# Patient Record
(demographics unavailable — no encounter records)

---

## 2024-10-23 NOTE — DISCUSSION/SUMMARY
[Normal Growth] : growth [Normal Development] : development  [No Elimination Concerns] : elimination [Continue Regimen] : feeding [No Skin Concerns] : skin [Normal Sleep Pattern] : sleep [None] : no medical problems [Anticipatory Guidance Given] : Anticipatory guidance addressed as per the history of present illness section [Physical Growth and Development] : physical growth and development [Social and Academic Competence] : social and academic competence [Emotional Well-Being] : emotional well-being [Risk Reduction] : risk reduction [Violence and Injury Prevention] : violence and injury prevention [No Vaccines] : no vaccines needed [No Medications] : ~He/She~ is not on any medications [Patient] : patient [Parent/Guardian] : Parent/Guardian [] : The components of the vaccine(s) to be administered today are listed in the plan of care. The disease(s) for which the vaccine(s) are intended to prevent and the risks have been discussed with the caretaker.  The risks are also included in the appropriate vaccination information statements which have been provided to the patient's caregiver.  The caregiver has given consent to vaccinate. [FreeTextEntry1] : Well   pre adolescent.  - BMI >95th % - Vaccine needed Plan -Flu Vaccine administered. Vaccine education done.  Updated CIR copy given Monitored X10 minutes and returned to class. Anticipatory guidance given -Counseled regarding dental hygiene, pubertal changes, seatbelt safety, and internet safety. Healthy eating habits, no sugary drinks, exercise and keeping recreational screen time to less than 2 hours a day discussed. - Infection prevention discussed - Bright Futures Parent handout sent home   Routine dental care           Visit summary sent home

## 2024-10-23 NOTE — HISTORY OF PRESENT ILLNESS
[Yes] : Patient goes to dentist yearly [Needs Immunizations] : Needs immunizations [Normal] : normal [LMP: _____] : LMP: [unfilled] [Age of Menarche: ____] : Age of Menarche: [unfilled] [Irregular menses] : irregular menses [Grade: ____] : Grade: [unfilled] [Normal Performance] : normal performance [Normal Behavior/Attention] : normal behavior/attention [Normal Homework] : normal homework [Eats regular meals including adequate fruits and vegetables] : eats regular meals including adequate fruits and vegetables [Drinks non-sweetened liquids] : drinks non-sweetened liquids  [Calcium source] : calcium source [Has friends] : has friends [Uses safety belts/safety equipment] : uses safety belts/safety equipment  [No] : Patient has not had sexual intercourse [Has ways to cope with stress] : has ways to cope with stress [Displays self-confidence] : displays self-confidence [With Teen] : teen [Heavy Bleeding] : no heavy bleeding [Painful Cramps] : no painful cramps [Has concerns about body or appearance] : does not have concerns about body or appearance [At least 1 hour of physical activity a day] : does not do at least 1 hour of physical activity a day [Screen time (except homework) less than 2 hours a day] : no screen time (except homework) less than 2 hours a day [Uses electronic nicotine delivery system] : does not use electronic nicotine delivery system [Exposure to electronic nicotine delivery system] : no exposure to electronic nicotine delivery system [Uses tobacco] : does not use tobacco [Exposure to tobacco] : no exposure to tobacco [Uses drugs] : does not use drugs  [Exposure to drugs] : no exposure to drugs [Exposure to alcohol] : no exposure to alcohol [Has problems with sleep] : does not have problems with sleep [Gets depressed, anxious, or irritable/has mood swings] : does not get depressed, anxious, or irritable/has mood swings [Has thought about hurting self or considered suicide] : has not thought about hurting self or considered suicide [FreeTextEntry8] : sometimes skips a month [de-identified] : Lives with Dad and 17 year old brother [FreeTextEntry1] : 12 year old here for well child exam and vaccines  HPI: She is feeling well today : no fever respiratory or GI issues VIs and consent sent previously   PMH; never hospitalized No allergies Home: lives with Dad, brother 16 yr, Uncle and Uncles friend Came from [place] 2 years ago No smokers Mom is in [employer]; 7th grade good student in [employer] Activity: likes to watch TV No mental health issues Dentist: Summer 2024

## 2024-10-23 NOTE — PHYSICAL EXAM

## 2024-10-23 NOTE — RISK ASSESSMENT
[Little interest or pleasure doing things] : 1) Little interest or pleasure doing things [Feeling down, depressed, or hopeless] : 2) Feeling down, depressed, or hopeless [0] : 2) Feeling down, depressed, or hopeless: Not at all (0) [PHQ-2 Negative - No further assessment needed] : PHQ-2 Negative - No further assessment needed [[id number]] : 0

## 2025-02-12 NOTE — HISTORY OF PRESENT ILLNESS
[de-identified] : abdominal pain [FreeTextEntry6] : 12 year old female with abdominal pain  HPI; abdominal pain started after she ate mozzarella sticks for lunch about one hour ago Pain level 4/10 No diarrhea or nausea LMP:  Feb 2025 No other concerns today

## 2025-02-12 NOTE — PHYSICAL EXAM
[NL] : clear to auscultation bilaterally [Soft] : soft [Tender] : tender [Distended] : nondistended [Normal Bowel Sounds] : normal bowel sounds [Rebound tenderness] : no rebound tenderness [FreeTextEntry9] : pain is centrally located

## 2025-02-12 NOTE — DISCUSSION/SUMMARY
[FreeTextEntry1] : Abdominal pain  Plan TC to Dad; voice mail is full Light diet today. Increase clear PO fluids and rest. Advance diet slowly as tolerated. To PMD  for worsening  symptoms.  Medication given

## 2025-05-16 NOTE — DISCUSSION/SUMMARY
[FreeTextEntry1] : Headache  Plan Medication given Increase PO fluid intake and rest. Discussed importance of eating a healthy breakfast and lunch. Stress management and sleep hygiene discussed. TC to aunt to inform her

## 2025-05-16 NOTE — HISTORY OF PRESENT ILLNESS
[de-identified] : headache [FreeTextEntry6] : 13 year old female with headache  HPI: states headache started during lunch about 1.5 hours ago Head hurts  " all over " and pain level is 7/10 Ate a good breakfast and lunch today No fever, sore throat, nasal congestion, cough or  GI complaints No one  home is ill